# Patient Record
Sex: MALE | Race: WHITE | URBAN - METROPOLITAN AREA
[De-identification: names, ages, dates, MRNs, and addresses within clinical notes are randomized per-mention and may not be internally consistent; named-entity substitution may affect disease eponyms.]

---

## 2021-03-09 ENCOUNTER — OFFICE VISIT (OUTPATIENT)
Dept: URBAN - METROPOLITAN AREA CLINIC 12 | Facility: CLINIC | Age: 46
End: 2021-03-09

## 2021-05-03 ENCOUNTER — OFFICE VISIT (OUTPATIENT)
Dept: URBAN - METROPOLITAN AREA CLINIC 19 | Facility: CLINIC | Age: 46
End: 2021-05-03

## 2022-03-08 ENCOUNTER — WEB ENCOUNTER (OUTPATIENT)
Dept: URBAN - METROPOLITAN AREA CLINIC 128 | Facility: CLINIC | Age: 47
End: 2022-03-08

## 2022-03-08 ENCOUNTER — DASHBOARD ENCOUNTERS (OUTPATIENT)
Age: 47
End: 2022-03-08

## 2022-03-08 ENCOUNTER — OFFICE VISIT (OUTPATIENT)
Dept: URBAN - METROPOLITAN AREA CLINIC 128 | Facility: CLINIC | Age: 47
End: 2022-03-08
Payer: COMMERCIAL

## 2022-03-08 VITALS
HEIGHT: 71 IN | BODY MASS INDEX: 28.98 KG/M2 | WEIGHT: 207 LBS | DIASTOLIC BLOOD PRESSURE: 78 MMHG | TEMPERATURE: 97.8 F | SYSTOLIC BLOOD PRESSURE: 149 MMHG | HEART RATE: 88 BPM

## 2022-03-08 DIAGNOSIS — K59.01 CONSTIPATION BY DELAYED COLONIC TRANSIT: ICD-10-CM

## 2022-03-08 DIAGNOSIS — Z12.11 COLON CANCER SCREENING: ICD-10-CM

## 2022-03-08 DIAGNOSIS — R10.12 LUQ ABDOMINAL PAIN: ICD-10-CM

## 2022-03-08 PROBLEM — 35298007: Status: ACTIVE | Noted: 2022-03-08

## 2022-03-08 PROCEDURE — 99203 OFFICE O/P NEW LOW 30 MIN: CPT | Performed by: INTERNAL MEDICINE

## 2022-03-08 NOTE — HPI-TODAY'S VISIT:
Mr. Oglesby is a pleasant 45yo gentleman who comes in for evaluation of LUQ abdominal pain that has been intermittent twice a week for 15 minutes for about 1.5 years.  He was treated for symptomatic left renolithiasis with lithotripsy about 2 years ago.  Non contrast CT at that time identified cholelithiasis as well but no obvious GI pathology.  The pain is intense cramping.  No assoc N/V. It seems to occur after meals.  No GI bleeding. He has chronic constipation that is also progressive -- BMs every 5-6 days at times assoc with bloating.  There is intermittent bouts of progressively loose stools and large volume evacuation.  He has not correlated pain with progressive constipation however.  No prior luminal GI evaluations.

## 2022-05-09 ENCOUNTER — OFFICE VISIT (OUTPATIENT)
Dept: URBAN - METROPOLITAN AREA CLINIC 128 | Facility: CLINIC | Age: 47
End: 2022-05-09

## 2023-11-16 ENCOUNTER — OFFICE VISIT (OUTPATIENT)
Dept: URBAN - METROPOLITAN AREA CLINIC 19 | Facility: CLINIC | Age: 48
End: 2023-11-16

## 2023-11-16 NOTE — HPI-ZZZTODAY'S VISIT
47-year-old male presents to the office for ER follow-up.  He was seen in the emergency room 11/9/2023 for flank pain, has a history of kidney stones, nausea without vomiting or diarrhea. 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  Cholelithiasis, left adrenal nodule likely an adenoma.